# Patient Record
Sex: FEMALE | Race: WHITE
[De-identification: names, ages, dates, MRNs, and addresses within clinical notes are randomized per-mention and may not be internally consistent; named-entity substitution may affect disease eponyms.]

---

## 2018-07-20 NOTE — DIAGNOSTIC IMAGING REPORT
PROCEDURE: CT head without contrast.



TECHNIQUE: Multiple contiguous axial images were obtained through

the brain without the use of intravenous contrast.



INDICATION:  Head injury.



FINDINGS: There is no calvarial fracture deformity. No

pneumocephalus or acute extra-axial fluid collection. There is no

hemorrhage. No edema, mass or mass effect. The ventricular system

nondilated and nondisplaced. The mastoid air cells and middle ear

cavities clear. The paranasal sinuses were visualized clear.



IMPRESSION:



Normal head CT revealed no acute or posttraumatic sequelae.



Dictated by: 



  Dictated on workstation # [id number]

## 2021-08-04 ENCOUNTER — HOSPITAL ENCOUNTER (EMERGENCY)
Dept: HOSPITAL 75 - ER | Age: 26
Discharge: HOME | End: 2021-08-04
Payer: OTHER GOVERNMENT

## 2021-08-04 VITALS — BODY MASS INDEX: 29.38 KG/M2 | WEIGHT: 169.98 LBS | HEIGHT: 63.9 IN

## 2021-08-04 VITALS — SYSTOLIC BLOOD PRESSURE: 124 MMHG | DIASTOLIC BLOOD PRESSURE: 72 MMHG

## 2021-08-04 DIAGNOSIS — Z20.822: ICD-10-CM

## 2021-08-04 DIAGNOSIS — J45.909: ICD-10-CM

## 2021-08-04 DIAGNOSIS — A08.4: Primary | ICD-10-CM

## 2021-08-04 DIAGNOSIS — F17.210: ICD-10-CM

## 2021-08-04 PROCEDURE — 87636 SARSCOV2 & INF A&B AMP PRB: CPT

## 2021-08-04 NOTE — ED GI
General


Stated Complaint:  SORE THROAT;STUFFY NOSE;SOB;COUGH;DIZZINESS


Source of Information:  Patient


Exam Limitations:  No Limitations





History of Present Illness


Date Seen by Provider:  Aug 4, 2021


Time Seen by Provider:  07:29


Initial Comments


Patient to the ER by private conveyance from work with chief complaint that she 

has vomited twice today mucus.  She has nausea congestion mild sore throat.  She

has multiple sick contacts with COVID-19 through her partner and her partner at 

work place.  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  She does have a 

history of asthma mostly her triggers or chemicals.  She has not been having any

wheezing coughing or fever.  She does have albuterol inhalers and antiallergy 

medicines.  She is currently on her period.  She did not receive an influenza 

vaccine last year however she did receive mode during a Covid vaccination in 

March.





Allergies and Home Medications


Allergies


Coded Allergies:  


     No Known Drug Allergies (Unverified , 8/4/21)





Patient Home Medication List


Home Medication List Reviewed:  Yes





Review of Systems


Review of Systems


Constitutional:  No chills, No diaphoresis


EENTM:  No Blurred Vision, No Double Vision; Nose Congestion, Throat Pain


Respiratory:  Cough; Denies Orthopnea, Denies Shortness of Air, Denies Wheezing


Gastrointestinal:  Denies Abdominal Pain, Denies Constipated, Denies Diarrhea


Genitourinary:  Denies Burning, Denies Discharge


Musculoskeletal:  No back pain, No joint pain


Skin:  No pruritus, No rash





All Other Systems Reviewed


Negative Unless Noted:  Yes





Past Medical-Social-Family Hx


Patient Social History


Tobacco Use?:  Yes


Tobacco type used:  Cigarettes


Smoking Status:  Current Someday Smoker


Use of E-Cig and/or Vaping dev:  Yes


E-Cig or Vaping type used:  Nicotine


Substance use?:  No





Physical Exam


Vital Signs





Vital Signs - First Documented








 8/4/21





 07:33


 


Temp 36.3


 


Pulse 87


 


Resp 18


 


B/P (MAP) 129/73 (91)


 


Pulse Ox 100


 


O2 Delivery Room Air





Capillary Refill :


Height/Weight/BMI


Height: '"


Weight: lbs. oz. kg;  BMI


Method:


General Appearance:  WD/WN, mild distress


HEENT:  PERRL/EOMI, pharynx normal


Neck:  full range of motion, supple, normal inspection


Respiratory:  lungs clear, normal breath sounds, no respiratory distress (Oxygen

saturation 100% on nonlabored, room air), no accessory muscle use


Cardiovascular:  normal peripheral pulses, regular rate, rhythm


Gastrointestinal:  normal bowel sounds, non tender, soft


Extremities:  normal inspection, normal capillary refill


Neurologic/Psychiatric:  alert, normal mood/affect, oriented x 3


Skin:  normal color, warm/dry





Progress/Results/Core Measures


Results/Orders


Lab Results





Laboratory Tests








Test


 8/4/21


07:40 Range/Units


 


 


Influenza Type A (RT-PCR) Not Detected  Not Detecte  


 


Influenza Type B (RT-PCR) Not Detected  Not Detecte  


 


SARS-CoV-2 RNA (RT-PCR) Not Detected  Not Detecte  








My Orders





Orders - BRIANA MILLS


Ondansetron  Oral Dissolve Tab (Zofran (8/4/21 07:45)


Ondansetron  Oral Dissolve Tab (Zofran (8/4/21 08:00)


Covid 19 Inhouse Test (8/4/21 07:47)


Influenza A And B By Pcr (8/4/21 07:47)





Medications Given in ED





Current Medications








 Medications  Dose


 Ordered  Sig/Kirby


 Route  Start Time


 Stop Time Status Last Admin


Dose Admin


 


 Ondansetron HCl  4 mg  ONCE  ONCE


 PO  8/4/21 08:00


 8/4/21 08:01 DC 8/4/21 07:56


4 MG








Vital Signs/I&O











 8/4/21 8/4/21





 07:33 07:33


 


Temp  36.3


 


Pulse  87


 


Resp  18


 


B/P (MAP)  129/73 (91)


 


Pulse Ox  100


 


O2 Delivery Room Air Room Air











Progress


Progress Note :  


   Time:  07:52


Progress Note


Covid and influenza swabs.  Zofran for nausea.  Vital signs are aseptic.  She 

has an albuterol inhaler at home as well as antiallergy medications.  We can 

provide her with some Tessalon Perles and ondansetron if it helps.





Departure


Impression





   Primary Impression:  


   Viral gastroenteritis


Disposition:  01 HOME, SELF-CARE


Condition:  Stable





Departure-Patient Inst.


Decision time for Depature:  08:20


Referrals:  


NO,LOCAL PHYSICIAN (PCP/Family)


Primary Care Physician


Patient Instructions:  Viral Gastroenteritis, Adult (DC)





Add. Discharge Instructions:  


Drink plenty of fluids.


Tylenol 1000 mg every 8 hours as necessary for body aches or fever.


Ibuprofen 800 mg every 8 hours as necessary for body aches and/or fever.


Zofran/ondansetron 1 tablet under the tongue every 6 hours as necessary for 

nausea and/or vomiting.


Return to the ER if you are having difficulty keeping up with your fluid intake 

despite the Zofran.


Scripts


Ondansetron (Ondansetron Odt) 4 Mg Tab.rapdis


4 MG PO Q6H PRN for NAUSEA/VOMITING, #15 TAB 0 Refills


   Prov: BRIANA MILLS         8/4/21


Work/School Note:  Work Release Form   Date Seen in the Emergency Department:  

Aug 4, 2021


   Return to Work:  Aug 9, 2021


   Restrictions:  Return-No Vomiting(24hrs)











BRIANA MILLS                  Aug 4, 2021 07:47